# Patient Record
Sex: MALE | Race: WHITE
[De-identification: names, ages, dates, MRNs, and addresses within clinical notes are randomized per-mention and may not be internally consistent; named-entity substitution may affect disease eponyms.]

---

## 2022-04-15 ENCOUNTER — APPOINTMENT (OUTPATIENT)
Dept: HEART AND VASCULAR | Facility: CLINIC | Age: 62
End: 2022-04-15
Payer: COMMERCIAL

## 2022-04-15 ENCOUNTER — NON-APPOINTMENT (OUTPATIENT)
Age: 62
End: 2022-04-15

## 2022-04-15 VITALS
BODY MASS INDEX: 28.04 KG/M2 | HEART RATE: 67 BPM | SYSTOLIC BLOOD PRESSURE: 130 MMHG | WEIGHT: 207 LBS | DIASTOLIC BLOOD PRESSURE: 80 MMHG | HEIGHT: 72 IN

## 2022-04-15 DIAGNOSIS — G47.33 OBSTRUCTIVE SLEEP APNEA (ADULT) (PEDIATRIC): ICD-10-CM

## 2022-04-15 DIAGNOSIS — Z86.59 PERSONAL HISTORY OF OTHER MENTAL AND BEHAVIORAL DISORDERS: ICD-10-CM

## 2022-04-15 DIAGNOSIS — I49.1 ATRIAL PREMATURE DEPOLARIZATION: ICD-10-CM

## 2022-04-15 DIAGNOSIS — J45.20 MILD INTERMITTENT ASTHMA, UNCOMPLICATED: ICD-10-CM

## 2022-04-15 DIAGNOSIS — I10 ESSENTIAL (PRIMARY) HYPERTENSION: ICD-10-CM

## 2022-04-15 PROCEDURE — 99204 OFFICE O/P NEW MOD 45 MIN: CPT | Mod: 25

## 2022-04-15 PROCEDURE — 93306 TTE W/DOPPLER COMPLETE: CPT

## 2022-04-15 PROCEDURE — 93000 ELECTROCARDIOGRAM COMPLETE: CPT

## 2022-04-15 RX ORDER — TESTOSTERONE 20.25 MG/1.25G
GEL, METERED TRANSDERMAL
Refills: 0 | Status: ACTIVE | COMMUNITY

## 2022-04-15 RX ORDER — ESCITALOPRAM OXALATE 20 MG/1
20 TABLET, FILM COATED ORAL DAILY
Refills: 0 | Status: ACTIVE | COMMUNITY

## 2022-04-15 RX ORDER — OMEPRAZOLE 40 MG/1
40 CAPSULE, DELAYED RELEASE ORAL
Refills: 0 | Status: ACTIVE | COMMUNITY

## 2022-04-15 NOTE — DISCUSSION/SUMMARY
[Anxiety] : anxiety disorder NOS [Stable] : stable [Serum Electrolytes] : serum electrolytes [Thyroid Function Tests] : thyroid function tests [Stress Test Treadmill] : an exercise treadmill test [Echocardiogram] : an echocardiogram [de-identified] : APCS [FreeTextEntry1] : Patient has what appears to be symptomatic APCs likely related to a recent URI and underlying JULIANN \par No meds for now \par Lvef 55% THickened AV 1-2 AI MILD AS \par reassurance given \par ETT \par JULIANN eal

## 2022-04-15 NOTE — PHYSICAL EXAM
[Well Developed] : well developed [Well Nourished] : well nourished [No Acute Distress] : no acute distress [Normal Conjunctiva] : normal conjunctiva [Normal Venous Pressure] : normal venous pressure [No Carotid Bruit] : no carotid bruit [No Gallop] : no gallop [5th Left ICS - MCL] : palpated at the 5th LICS in the midclavicular line [Premature Beats] : regular with premature beats [Normal S1] : normal S1 [Normal S2] : normal S2 [II] : a grade 2 [Clear Lung Fields] : clear lung fields [Good Air Entry] : good air entry [No Respiratory Distress] : no respiratory distress  [Soft] : abdomen soft [Non Tender] : non-tender [No Masses/organomegaly] : no masses/organomegaly [Normal Bowel Sounds] : normal bowel sounds [Normal Gait] : normal gait [Normal] : no edema, no cyanosis, no clubbing, no varicosities [No Edema] : no edema [No Cyanosis] : no cyanosis [No Clubbing] : no clubbing [No Varicosities] : no varicosities [No Rash] : no rash [No Skin Lesions] : no skin lesions [Moves all extremities] : moves all extremities [No Focal Deficits] : no focal deficits [Normal Speech] : normal speech [Alert and Oriented] : alert and oriented [Normal memory] : normal memory

## 2022-04-15 NOTE — REVIEW OF SYSTEMS
[Feeling Fatigued] : feeling fatigued [Chest Discomfort] : chest discomfort [Palpitations] : palpitations [Negative] : Musculoskeletal [Headache] : no headache [Lower Ext Edema] : no extremity edema [Leg Claudication] : no intermittent leg claudication [Orthopnea] : no orthopnea [Syncope] : no syncope [FreeTextEntry6] : snoring

## 2022-04-15 NOTE — REASON FOR VISIT
[Symptom and Test Evaluation] : symptom and test evaluation [Arrhythmia/ECG Abnorrmalities] : arrhythmia/ECG abnormalities [Structural Heart and Valve Disease] : structural heart and valve disease [FreeTextEntry3] : Dr MASON Benitez  [FreeTextEntry1] : Arrhythmia

## 2022-04-15 NOTE — HISTORY OF PRESENT ILLNESS
[FreeTextEntry1] : Patient is a 61-year-old white male with a prior history of mitral valve prolapse and hypertension who was well in his usual state of health up until 2 weeks ago when he developed what appears to be an upper respiratory infection characterized by cough cold and fatigue he was COVID-negative at that time.  Since that time he has been troubled by frequent extrasystoles characterized by skipped beat with no associated lightheadedness dizziness .  Patient has noted atypical midsternal chest pain which is not effort related sleep pattern is moderately disturbed wife notes excessive snoring which is getting worse recently he has no morning headaches but moderate daytime fatigability.  There is no family history of heart disease allergies sulfa is noted

## 2023-06-12 ENCOUNTER — NON-APPOINTMENT (OUTPATIENT)
Age: 63
End: 2023-06-12

## 2023-07-03 ENCOUNTER — APPOINTMENT (OUTPATIENT)
Dept: HEART AND VASCULAR | Facility: CLINIC | Age: 63
End: 2023-07-03
Payer: COMMERCIAL

## 2023-07-03 VITALS — DIASTOLIC BLOOD PRESSURE: 70 MMHG | SYSTOLIC BLOOD PRESSURE: 130 MMHG

## 2023-07-03 DIAGNOSIS — I82.729 CHRONIC EMBOLISM AND THROMBOSIS OF DEEP VEINS OF UNSPECIFIED UPPER EXTREMITY: ICD-10-CM

## 2023-07-03 PROCEDURE — 99215 OFFICE O/P EST HI 40 MIN: CPT | Mod: 25

## 2023-07-03 PROCEDURE — ZZZZZ: CPT

## 2023-07-03 PROCEDURE — 93306 TTE W/DOPPLER COMPLETE: CPT

## 2023-07-03 PROCEDURE — 93000 ELECTROCARDIOGRAM COMPLETE: CPT

## 2023-07-03 RX ORDER — FLUTICASONE PROPION/SALMETEROL 250-50 MCG
250-50 BLISTER, WITH INHALATION DEVICE INHALATION DAILY
Refills: 0 | Status: ACTIVE | COMMUNITY

## 2023-07-03 NOTE — REASON FOR VISIT
[Symptom and Test Evaluation] : symptom and test evaluation [Arrhythmia/ECG Abnorrmalities] : arrhythmia/ECG abnormalities [Structural Heart and Valve Disease] : structural heart and valve disease [FreeTextEntry3] : Dr MASON Benitez [FreeTextEntry1] : [facility]

## 2023-07-03 NOTE — REVIEW OF SYSTEMS
[Feeling Fatigued] : feeling fatigued [Chest Discomfort] : chest discomfort [Palpitations] : palpitations [Snoring] : snoring [Negative] : Genitourinary [Headache] : no headache [Lower Ext Edema] : no extremity edema [Leg Claudication] : no intermittent leg claudication [Orthopnea] : no orthopnea [Syncope] : no syncope

## 2023-07-03 NOTE — DISCUSSION/SUMMARY
[FreeTextEntry1] : \par Patient is status post an admission to local hospital for strep viridans bacteremia consistent with endocarditis transesophageal echo did not reveal any vegetations there was some nodular thickening of the aortic valve with moderate aortic insufficiency which was present 1 year prior on an echo dated April 15, 2022.  Patient is currently completing a 4-week cycle of antibiotics his course was somewhat complicated by a clot in the right PICC line now he has an indwelling left PICC line and will be completing a course of Eliquis for 3 months.  Clinically speaking patient has no current signs of effort related dyspnea leg edema orthopnea PND.  Echocardiogram reveals preserved LV function with no LV dilatation thickening of the aortic valve no mobile vegetations noted with at least moderate aortic insufficiency which is consistent with an echo from 1 year ago.  Patient currently does not meet any specific indications for aortic valve replacement hemodynamically he is quite stable with no hemodynamic features of heart failure he has no current signs of persistent bacteremia and the bacteria was not of a suspicious nature.  No signs of abscess was noted on the initial FELICIANO done in the hospital.  Patient will of course need chronic dental prophylaxis prior to any procedures which was discussed at length with the patient.  He will be seeing a cardiac interventionalists at [facility] about a potential TAVR procedure which I find not advisable as currently TAVR is not indicated for aortic insufficiency and certainly has no current data for Tuesday and post endocarditis patients.  He will be followed up here approximately 2 months [EKG obtained to assist in diagnosis and management of assessed problem(s)] : EKG obtained to assist in diagnosis and management of assessed problem(s)

## 2023-07-03 NOTE — PHYSICAL EXAM
[Well Developed] : well developed [Well Nourished] : well nourished [No Acute Distress] : no acute distress [Normal Conjunctiva] : normal conjunctiva [Normal Venous Pressure] : normal venous pressure [No Carotid Bruit] : no carotid bruit [No Gallop] : no gallop [5th Left ICS - MCL] : palpated at the 5th LICS in the midclavicular line [Normal] : normal [Premature Beats] : regular with premature beats [Normal S1] : normal S1 [Normal S2] : normal S2 [II] : a grade 2 [Clear Lung Fields] : clear lung fields [Good Air Entry] : good air entry [No Respiratory Distress] : no respiratory distress  [Soft] : abdomen soft [Non Tender] : non-tender [No Masses/organomegaly] : no masses/organomegaly [Normal Bowel Sounds] : normal bowel sounds [Normal Gait] : normal gait [No Edema] : no edema [No Cyanosis] : no cyanosis [No Clubbing] : no clubbing [No Varicosities] : no varicosities [No Rash] : no rash [No Skin Lesions] : no skin lesions [Moves all extremities] : moves all extremities [No Focal Deficits] : no focal deficits [Normal Speech] : normal speech [Alert and Oriented] : alert and oriented [Normal memory] : normal memory

## 2023-07-03 NOTE — HISTORY OF PRESENT ILLNESS
[FreeTextEntry1] : Patient is a 61-year-old white male with a prior history of mitral valve prolapse and hypertension who was well in his usual state of health up until 2 weeks ago when he developed what appears to be an upper respiratory infection characterized by cough cold and fatigue he was COVID-negative at that time. Since that time he has been troubled by frequent extrasystoles characterized by skipped beat with no associated lightheadedness dizziness . Patient has noted atypical midsternal chest pain which is not effort related sleep pattern is moderately disturbed wife notes excessive snoring which is getting worse recently he has no morning headaches but moderate daytime fatigability. There is no family history of heart disease allergies sulfa is noted \par 07/03/23\par Patient is recently status post admission to [facility] (formerly [facility]3] for febrile illness and was found to have strep viridans on a blood culture preceded by repeated dental procedures.  Transesophageal echo there revealed moderate aortic insufficiency which patient had on a baseline echo approximately 1 year ago no clear vegetations were seen patient was placed on a 4-week course antibiotics via a PICC line he did however develop a thrombosis of the right axillary vein for which she is currently on Eliquis new PICC line was placed in the left arm other than antibiotics Eliquis he is on no current medications he denies any recent symptoms of effort intolerance shortness of breath edema or chest pain.  He has been afebrile and blood cultures have been sterilized

## 2023-09-05 ENCOUNTER — NON-APPOINTMENT (OUTPATIENT)
Age: 63
End: 2023-09-05

## 2023-09-05 ENCOUNTER — APPOINTMENT (OUTPATIENT)
Dept: HEART AND VASCULAR | Facility: CLINIC | Age: 63
End: 2023-09-05
Payer: COMMERCIAL

## 2023-09-05 VITALS
SYSTOLIC BLOOD PRESSURE: 140 MMHG | BODY MASS INDEX: 27.09 KG/M2 | WEIGHT: 200 LBS | HEIGHT: 72 IN | HEART RATE: 60 BPM | DIASTOLIC BLOOD PRESSURE: 78 MMHG

## 2023-09-05 PROCEDURE — 93000 ELECTROCARDIOGRAM COMPLETE: CPT

## 2023-09-05 PROCEDURE — 99214 OFFICE O/P EST MOD 30 MIN: CPT | Mod: 25

## 2023-09-05 NOTE — DISCUSSION/SUMMARY
[EKG obtained to assist in diagnosis and management of assessed problem(s)] : EKG obtained to assist in diagnosis and management of assessed problem(s) [FreeTextEntry1] : Patient is status post an admission to local hospital for strep viridans bacteremia consistent with endocarditis transesophageal echo did not reveal any vegetations there was some nodular thickening of the aortic valve with moderate aortic insufficiency which was present 1 year prior on an echo dated April 15, 2022. Patient is currently completing a 4-week cycle of antibiotics his course was somewhat complicated by a clot in the right PICC line now he has an indwelling left PICC line and will be completing a course of Eliquis for 3 months. Clinically speaking patient has no current signs of effort related dyspnea leg edema orthopnea PND. Echocardiogram reveals preserved LV function with no LV dilatation thickening of the aortic valve no mobile vegetations noted with at least moderate aortic insufficiency which is consistent with an echo from 1 year ago. Patient currently does not meet any specific indications for aortic valve replacement hemodynamically he is quite stable with no hemodynamic features of heart failure he has no current signs of persistent bacteremia and the bacteria was not of a suspicious nature. No signs of abscess was noted on the initial FELICIANO done in the hospital. Patient will of course need chronic dental prophylaxis prior to any procedures which was discussed at length with the patient. He will be seeing a cardiac interventionalists at [facility] about a potential TAVR procedure which I find not advisable as currently TAVR is not indicated for aortic insufficiency and certainly has no current data for Tuesday and post f/u echo in jan 2024

## 2023-09-05 NOTE — REASON FOR VISIT
[Symptom and Test Evaluation] : symptom and test evaluation [Arrhythmia/ECG Abnorrmalities] : arrhythmia/ECG abnormalities [Structural Heart and Valve Disease] : structural heart and valve disease [FreeTextEntry3] : Dr MASON Benitez.

## 2023-09-05 NOTE — HISTORY OF PRESENT ILLNESS
[FreeTextEntry1] : Patient is a 61-year-old white male with a prior history of mitral valve prolapse and hypertension who was well in his usual state of health up until 2 weeks ago when he developed what appears to be an upper respiratory infection characterized by cough cold and fatigue he was COVID-negative at that time. Since that time he has been troubled by frequent extrasystoles characterized by skipped beat with no associated lightheadedness dizziness . Patient has noted atypical midsternal chest pain which is not effort related sleep pattern is moderately disturbed wife notes excessive snoring which is getting worse recently he has no morning headaches but moderate daytime fatigability. There is no family history of heart disease allergies sulfa is noted 07/03/23 Patient is recently status post admission to [facility] for febrile illness and was found to have strep viridans on a blood culture preceded by repeated dental procedures. Transesophageal echo there revealed moderate aortic insufficiency which patient had on a baseline echo approximately 1 year ago no clear vegetations were seen patient was placed on a 4-week course antibiotics via a PICC line he did however develop a thrombosis of the right axillary vein for which she is currently on Eliquis new PICC line was placed in the left arm other than antibiotics Eliquis he is on no current medications he denies any recent symptoms of effort intolerance shortness of breath edema or chest pain. He has been afebrile and blood cultures have been sterilized 09/05/23  PRIOR sbe ON av  DOING WELL AFTER ABX  NO NEED FOR AVR SEN BY PAYAM ID  NO SOB OR SSCP

## 2023-09-05 NOTE — REVIEW OF SYSTEMS
[Feeling Fatigued] : feeling fatigued [Chest Discomfort] : chest discomfort [Palpitations] : palpitations [Snoring] : snoring [Negative] : Genitourinary [Headache] : no headache [Lower Ext Edema] : no extremity edema [Orthopnea] : no orthopnea [Syncope] : no syncope

## 2023-09-05 NOTE — ADDENDUM
[FreeTextEntry1] : I, Luciano Call, assisted in documentation on 09/05/2023 acting as a scribe for Dr. Wilian Martinez.

## 2024-01-12 ENCOUNTER — APPOINTMENT (OUTPATIENT)
Dept: HEART AND VASCULAR | Facility: CLINIC | Age: 64
End: 2024-01-12
Payer: MEDICARE

## 2024-01-12 ENCOUNTER — NON-APPOINTMENT (OUTPATIENT)
Age: 64
End: 2024-01-12

## 2024-01-12 VITALS
BODY MASS INDEX: 27.77 KG/M2 | WEIGHT: 205 LBS | SYSTOLIC BLOOD PRESSURE: 120 MMHG | HEIGHT: 72 IN | DIASTOLIC BLOOD PRESSURE: 80 MMHG | HEART RATE: 63 BPM

## 2024-01-12 DIAGNOSIS — Z00.00 ENCOUNTER FOR GENERAL ADULT MEDICAL EXAMINATION W/OUT ABNORMAL FINDINGS: ICD-10-CM

## 2024-01-12 DIAGNOSIS — I35.1 NONRHEUMATIC AORTIC (VALVE) INSUFFICIENCY: ICD-10-CM

## 2024-01-12 DIAGNOSIS — I35.8 OTHER NONRHEUMATIC AORTIC VALVE DISORDERS: ICD-10-CM

## 2024-01-12 DIAGNOSIS — I34.1 NONRHEUMATIC MITRAL (VALVE) PROLAPSE: ICD-10-CM

## 2024-01-12 PROCEDURE — 99214 OFFICE O/P EST MOD 30 MIN: CPT

## 2024-01-12 PROCEDURE — 93000 ELECTROCARDIOGRAM COMPLETE: CPT

## 2024-01-12 PROCEDURE — G2211 COMPLEX E/M VISIT ADD ON: CPT

## 2024-01-12 RX ORDER — AMOXICILLIN 500 MG/1
500 TABLET, FILM COATED ORAL
Qty: 20 | Refills: 3 | Status: ACTIVE | COMMUNITY
Start: 2023-07-03

## 2024-01-12 RX ORDER — APIXABAN 5 MG/1
5 TABLET, FILM COATED ORAL
Qty: 60 | Refills: 0 | Status: DISCONTINUED | COMMUNITY
End: 2024-01-12

## 2024-01-12 RX ORDER — LOSARTAN POTASSIUM 50 MG/1
50 TABLET, FILM COATED ORAL DAILY
Qty: 90 | Refills: 0 | Status: ACTIVE | COMMUNITY

## 2024-01-12 NOTE — HISTORY OF PRESENT ILLNESS
[FreeTextEntry1] : Patient is a 61-year-old white male with a prior history of mitral valve prolapse and hypertension who was well in his usual state of health up until 2 weeks ago when he developed what appears to be an upper respiratory infection characterized by cough cold and fatigue he was COVID-negative at that time. Since that time he has been troubled by frequent extrasystoles characterized by skipped beat with no associated lightheadedness dizziness . Patient has noted atypical midsternal chest pain which is not effort related sleep pattern is moderately disturbed wife notes excessive snoring which is getting worse recently he has no morning headaches but moderate daytime fatigability. There is no family history of heart disease allergies sulfa is noted 07/03/23 Patient is recently status post admission to [facility] for febrile illness and was found to have strep viridans on a blood culture preceded by repeated dental procedures. Transesophageal echo there revealed moderate aortic insufficiency which patient had on a baseline echo approximately 1 year ago no clear vegetations were seen patient was placed on a 4-week course antibiotics via a PICC line he did however develop a thrombosis of the right axillary vein for which she is currently on Eliquis new PICC line was placed in the left arm other than antibiotics Eliquis he is on no current medications he denies any recent symptoms of effort intolerance shortness of breath edema or chest pain. He has been afebrile and blood cultures have been sterilized 09/05/23  PRIOR sbe ON av  DOING WELL AFTER ABX  NO NEED FOR AVR SEEN BY PAYAM ID  NO SOB OR SSCP  1/12/24  has left his job looking for work under stress '

## 2024-09-30 ENCOUNTER — APPOINTMENT (OUTPATIENT)
Dept: HEART AND VASCULAR | Facility: CLINIC | Age: 64
End: 2024-09-30
Payer: MEDICARE

## 2024-09-30 ENCOUNTER — NON-APPOINTMENT (OUTPATIENT)
Age: 64
End: 2024-09-30

## 2024-09-30 ENCOUNTER — APPOINTMENT (OUTPATIENT)
Dept: HEART AND VASCULAR | Facility: CLINIC | Age: 64
End: 2024-09-30

## 2024-09-30 VITALS
WEIGHT: 209 LBS | HEART RATE: 66 BPM | DIASTOLIC BLOOD PRESSURE: 68 MMHG | HEIGHT: 72 IN | BODY MASS INDEX: 28.31 KG/M2 | SYSTOLIC BLOOD PRESSURE: 116 MMHG

## 2024-09-30 DIAGNOSIS — Z00.00 ENCOUNTER FOR GENERAL ADULT MEDICAL EXAMINATION W/OUT ABNORMAL FINDINGS: ICD-10-CM

## 2024-09-30 DIAGNOSIS — I35.1 NONRHEUMATIC AORTIC (VALVE) INSUFFICIENCY: ICD-10-CM

## 2024-09-30 PROCEDURE — ZZZZZ: CPT

## 2024-09-30 PROCEDURE — G2211 COMPLEX E/M VISIT ADD ON: CPT

## 2024-09-30 PROCEDURE — 93000 ELECTROCARDIOGRAM COMPLETE: CPT

## 2024-09-30 PROCEDURE — 99214 OFFICE O/P EST MOD 30 MIN: CPT

## 2024-09-30 PROCEDURE — 93306 TTE W/DOPPLER COMPLETE: CPT

## 2024-09-30 NOTE — DISCUSSION/SUMMARY
[FreeTextEntry1] : s/p SBE AV clinically stable  Echo Lvef 55 MOD - SEV AI  non dilated LV   [EKG obtained to assist in diagnosis and management of assessed problem(s)] : EKG obtained to assist in diagnosis and management of assessed problem(s)

## 2024-09-30 NOTE — HISTORY OF PRESENT ILLNESS
[FreeTextEntry1] : Patient is a 61-year-old white male with a prior history of mitral valve prolapse and hypertension who was well in his usual state of health up until 2 weeks ago when he developed what appears to be an upper respiratory infection characterized by cough cold and fatigue he was COVID-negative at that time. Since that time he has been troubled by frequent extrasystoles characterized by skipped beat with no associated lightheadedness dizziness . Patient has noted atypical midsternal chest pain which is not effort related sleep pattern is moderately disturbed wife notes excessive snoring which is getting worse recently he has no morning headaches but moderate daytime fatigability. There is no family history of heart disease allergies sulfa is noted 07/03/23 Patient is recently status post admission to [facility] for febrile illness and was found to have strep viridans on a blood culture preceded by repeated dental procedures. Transesophageal echo there revealed moderate aortic insufficiency which patient had on a baseline echo approximately 1 year ago no clear vegetations were seen patient was placed on a 4-week course antibiotics via a PICC line he did however develop a thrombosis of the right axillary vein for which she is currently on Eliquis new PICC line was placed in the left arm other than antibiotics Eliquis he is on no current medications he denies any recent symptoms of effort intolerance shortness of breath edema or chest pain. He has been afebrile and blood cultures have been sterilized 09/05/23  PRIOR sbe ON av  DOING WELL AFTER ABX  NO NEED FOR AVR SEEN BY PAYAM ID  NO SOB OR SSCP  1/12/24  has left his job looking for work under stress ' 9/30/24 no sob remanins active

## 2025-01-08 ENCOUNTER — APPOINTMENT (OUTPATIENT)
Dept: HEART AND VASCULAR | Facility: CLINIC | Age: 65
End: 2025-01-08
Payer: MEDICARE

## 2025-01-08 ENCOUNTER — NON-APPOINTMENT (OUTPATIENT)
Age: 65
End: 2025-01-08

## 2025-01-08 VITALS
HEIGHT: 72 IN | WEIGHT: 211 LBS | HEART RATE: 66 BPM | SYSTOLIC BLOOD PRESSURE: 152 MMHG | DIASTOLIC BLOOD PRESSURE: 90 MMHG | BODY MASS INDEX: 28.58 KG/M2

## 2025-01-08 VITALS — DIASTOLIC BLOOD PRESSURE: 90 MMHG | SYSTOLIC BLOOD PRESSURE: 160 MMHG

## 2025-01-08 DIAGNOSIS — I49.1 ATRIAL PREMATURE DEPOLARIZATION: ICD-10-CM

## 2025-01-08 DIAGNOSIS — I34.1 NONRHEUMATIC MITRAL (VALVE) PROLAPSE: ICD-10-CM

## 2025-01-08 PROCEDURE — G2211 COMPLEX E/M VISIT ADD ON: CPT

## 2025-01-08 PROCEDURE — 93000 ELECTROCARDIOGRAM COMPLETE: CPT

## 2025-01-08 PROCEDURE — 99214 OFFICE O/P EST MOD 30 MIN: CPT

## 2025-01-13 ENCOUNTER — APPOINTMENT (OUTPATIENT)
Dept: HEART AND VASCULAR | Facility: CLINIC | Age: 65
End: 2025-01-13
Payer: MEDICARE

## 2025-01-13 DIAGNOSIS — R07.89 OTHER CHEST PAIN: ICD-10-CM

## 2025-01-13 PROCEDURE — 93015 CV STRESS TEST SUPVJ I&R: CPT
